# Patient Record
Sex: MALE | NOT HISPANIC OR LATINO | URBAN - METROPOLITAN AREA
[De-identification: names, ages, dates, MRNs, and addresses within clinical notes are randomized per-mention and may not be internally consistent; named-entity substitution may affect disease eponyms.]

---

## 2018-06-11 ENCOUNTER — OFFICE VISIT (OUTPATIENT)
Dept: URBAN - METROPOLITAN AREA CLINIC 33 | Facility: CLINIC | Age: 68
End: 2018-06-11
Payer: MEDICARE

## 2018-06-11 PROCEDURE — 99214 OFFICE O/P EST MOD 30 MIN: CPT | Performed by: OPHTHALMOLOGY

## 2018-06-11 RX ORDER — DORZOLAMIDE HYDROCHLORIDE AND TIMOLOL MALEATE 20; 5 MG/ML; MG/ML
SOLUTION/ DROPS OPHTHALMIC
Qty: 10 | Refills: 3 | Status: INACTIVE
Start: 2018-06-11 | End: 2019-03-15

## 2018-06-11 ASSESSMENT — INTRAOCULAR PRESSURE
OS: 27
OD: 23

## 2018-06-11 NOTE — IMPRESSION/PLAN
Impression: Other specified glaucoma / NVI OS: H40.89. OS. Vision: vision affected. Condition: stable post AV OS #1  01/30/2017. Goal mid teens OU Plan: Discussed diagnosis in detail with patient. Discussed treatment options with patient. Recommend patient start Cosopt BID OU. Stressed compliance of using gtts and not going off gtts without calling office. Patient d/c jackeline. If IOP is still elevated consider Latanoprost QHS.

## 2018-07-24 ENCOUNTER — OFFICE VISIT (OUTPATIENT)
Dept: URBAN - METROPOLITAN AREA CLINIC 32 | Facility: CLINIC | Age: 68
End: 2018-07-24
Payer: MEDICARE

## 2018-07-24 PROCEDURE — 99214 OFFICE O/P EST MOD 30 MIN: CPT | Performed by: OPTOMETRIST

## 2018-07-24 ASSESSMENT — INTRAOCULAR PRESSURE
OS: 13
OD: 15
OS: 16
OD: 17

## 2018-07-24 NOTE — IMPRESSION/PLAN
Impression: Other specified glaucoma / NVI OS: H40.89. OS. Vision: vision affected. Condition: stable post AV OS #1  01/30/2017. Goal mid teens OU Plan: Discussed diagnosis in detail with patient. Discussed treatment options with patient. Recommend patient cont Cosopt BID OU. Stressed compliance of using gtts and not going off gtts without calling office. Patient d/c jackeline. If IOP is still elevated consider Latanoprost QHS.

## 2018-10-23 ENCOUNTER — OFFICE VISIT (OUTPATIENT)
Dept: URBAN - METROPOLITAN AREA CLINIC 32 | Facility: CLINIC | Age: 68
End: 2018-10-23
Payer: MEDICARE

## 2018-10-23 PROCEDURE — 92014 COMPRE OPH EXAM EST PT 1/>: CPT | Performed by: OPTOMETRIST

## 2018-10-23 ASSESSMENT — INTRAOCULAR PRESSURE
OD: 12
OS: 12

## 2018-10-23 ASSESSMENT — VISUAL ACUITY
OS: 20/40
OD: 20/40

## 2019-07-16 ENCOUNTER — OFFICE VISIT (OUTPATIENT)
Dept: URBAN - METROPOLITAN AREA CLINIC 32 | Facility: CLINIC | Age: 69
End: 2019-07-16
Payer: MEDICARE

## 2019-07-16 DIAGNOSIS — H52.4 PRESBYOPIA: Primary | ICD-10-CM

## 2019-07-16 PROCEDURE — V2799 MISC VISION ITEM OR SERVICE: HCPCS | Performed by: OPTOMETRIST

## 2019-07-16 ASSESSMENT — VISUAL ACUITY
OD: 20/40
OS: 20/40

## 2019-10-15 ENCOUNTER — OFFICE VISIT (OUTPATIENT)
Dept: URBAN - METROPOLITAN AREA CLINIC 32 | Facility: CLINIC | Age: 69
End: 2019-10-15
Payer: MEDICARE

## 2019-10-15 DIAGNOSIS — H40.89 OTHER SPECIFIED GLAUCOMA: ICD-10-CM

## 2019-10-15 DIAGNOSIS — H40.1233 LOW-TENSION GLAUCOMA, BILATERAL, SEVERE STAGE: Primary | ICD-10-CM

## 2019-10-15 PROCEDURE — 92133 CPTRZD OPH DX IMG PST SGM ON: CPT | Performed by: OPTOMETRIST

## 2019-10-15 PROCEDURE — 99214 OFFICE O/P EST MOD 30 MIN: CPT | Performed by: OPTOMETRIST

## 2019-10-15 RX ORDER — BRIMONIDINE TARTRATE, TIMOLOL MALEATE 2; 5 MG/ML; MG/ML
SOLUTION/ DROPS OPHTHALMIC
Qty: 1 | Refills: 6 | Status: INACTIVE
Start: 2019-10-15 | End: 2019-10-16

## 2019-10-15 RX ORDER — TIMOLOL 5.12 MG/ML
0.5 % SOLUTION/ DROPS OPHTHALMIC
Qty: 1 | Refills: 3 | Status: INACTIVE
Start: 2019-10-15 | End: 2019-10-16

## 2019-10-15 RX ORDER — BRINZOLAMIDE 10 MG/ML
1 % SUSPENSION/ DROPS OPHTHALMIC
Qty: 1 | Refills: 3 | Status: INACTIVE
Start: 2019-10-15 | End: 2019-10-16

## 2019-10-15 RX ORDER — LATANOPROST 50 UG/ML
0.005 % SOLUTION OPHTHALMIC
Qty: 5 | Refills: 3 | Status: INACTIVE
Start: 2019-10-15 | End: 2020-01-14

## 2019-10-15 ASSESSMENT — INTRAOCULAR PRESSURE
OD: 18
OS: 16

## 2019-10-15 NOTE — IMPRESSION/PLAN
Impression: Other specified glaucoma / NVI OS: H40.89. OS. Vision: vision affected. Condition: stable post AV OS #1  01/30/2017. Goal mid teens OU Plan: Discussed diagnosis in detail with patient. Discussed treatment options with patient. Recommend patient cont Cosopt BID OU. Stressed compliance of using gtts and not going off gtts without calling office. start timolol BID OU azopt BID OU If IOP is still elevated consider Latanoprost QHS.

## 2020-01-14 ENCOUNTER — OFFICE VISIT (OUTPATIENT)
Dept: URBAN - METROPOLITAN AREA CLINIC 32 | Facility: CLINIC | Age: 70
End: 2020-01-14
Payer: MEDICARE

## 2020-01-14 PROCEDURE — 99214 OFFICE O/P EST MOD 30 MIN: CPT | Performed by: OPTOMETRIST

## 2020-01-14 RX ORDER — BRINZOLAMIDE 10 MG/ML
1 % SUSPENSION/ DROPS OPHTHALMIC
Qty: 1 | Refills: 10 | Status: INACTIVE
Start: 2020-01-14 | End: 2020-01-14

## 2020-01-14 RX ORDER — TIMOLOL MALEATE 5 MG/ML
0.5 % SOLUTION/ DROPS OPHTHALMIC
Qty: 5 | Refills: 5 | Status: INACTIVE
Start: 2020-01-14 | End: 2020-08-18

## 2020-01-14 RX ORDER — DORZOLAMIDE HYDROCHLORIDE AND TIMOLOL MALEATE 20; 5 MG/ML; MG/ML
SOLUTION/ DROPS OPHTHALMIC
Qty: 10 | Refills: 3 | Status: INACTIVE
Start: 2020-01-14 | End: 2020-01-14

## 2020-01-14 RX ORDER — BRINZOLAMIDE 10 MG/ML
1 % SUSPENSION/ DROPS OPHTHALMIC
Qty: 5 | Refills: 5 | Status: INACTIVE
Start: 2020-01-14 | End: 2020-08-18

## 2020-01-14 RX ORDER — LATANOPROST 50 UG/ML
0.005 % SOLUTION OPHTHALMIC
Qty: 5 | Refills: 5 | Status: INACTIVE
Start: 2020-01-14 | End: 2021-05-13

## 2020-01-14 RX ORDER — TIMOLOL MALEATE 5 MG/ML
0.5 % SOLUTION/ DROPS OPHTHALMIC
Qty: 5 | Refills: 0 | Status: INACTIVE
Start: 2020-01-14 | End: 2020-01-14

## 2020-01-14 ASSESSMENT — INTRAOCULAR PRESSURE
OS: 15
OD: 14

## 2020-01-14 NOTE — IMPRESSION/PLAN
Impression: Other specified glaucoma / NVI OS: H40.89. OS. Vision: vision affected. Condition: stable post AV OS #1  01/30/2017. Goal mid teens OU Plan: Discussed diagnosis in detail with patient. Discussed treatment options with patient. Recommend patient cont Cosopt BID OU. Stressed compliance of using gtts and not going off gtts without calling office. restart timolol BID OU azopt BID OU If IOP is still elevated, refer for eval with MD

## 2020-08-18 ENCOUNTER — OFFICE VISIT (OUTPATIENT)
Dept: URBAN - METROPOLITAN AREA CLINIC 23 | Facility: CLINIC | Age: 70
End: 2020-08-18
Payer: MEDICARE

## 2020-08-18 PROCEDURE — 92133 CPTRZD OPH DX IMG PST SGM ON: CPT | Performed by: OPHTHALMOLOGY

## 2020-08-18 PROCEDURE — 92014 COMPRE OPH EXAM EST PT 1/>: CPT | Performed by: OPHTHALMOLOGY

## 2020-08-18 PROCEDURE — 92020 GONIOSCOPY: CPT | Performed by: OPHTHALMOLOGY

## 2020-08-18 RX ORDER — DORZOLAMIDE HYDROCHLORIDE AND TIMOLOL MALEATE 20; 5 MG/ML; MG/ML
SOLUTION/ DROPS OPHTHALMIC
Qty: 1 | Refills: 3 | Status: INACTIVE
Start: 2020-08-18 | End: 2020-11-10

## 2020-08-18 ASSESSMENT — INTRAOCULAR PRESSURE
OD: 14
OS: 14

## 2020-08-18 NOTE — IMPRESSION/PLAN
Impression: Chronic angle-closure glaucoma, bilateral, severe stage: G84.7081. Plan: Pt has Glaucoma    Gonio : Synichea, no drain to speak of      Pachs:512/496      Today's IOP :14/14      Tmax & date :  32/50 Target IOP low to mid teens
(+)Fhx of Glaucoma: Mother Right / Left eye is the better seeing eye (Last vf &date )
C/D:  
OCT:59/63 8/18/20 Pt denies Sulfa Allergy   // Pt denies Lung /Heart dx Pt is currently using : Azopt + Timolol BID OU L@ 5:30am. Lumigan QHS OU L@ 9pm. Systane QID OU. No previous medications used  / Previously used medications : 
Plan :
1. Continue (Change brand to generic) Latanoprost QHS OU Dorzolamide-Timolol BID OU 2. Discussed with patient due to findings on OCT, and posterior examination ,continuation of  treatment is recommended for Glaucoma. IOP is too high for the level of glaucomatous damage at the present time. Recommend lowering IOP. Will change patients branded medications to generics.  
3. IOP check in 1 month with  VF 24-2 OD/10-2 OS

## 2020-10-06 ENCOUNTER — OFFICE VISIT (OUTPATIENT)
Dept: URBAN - METROPOLITAN AREA CLINIC 23 | Facility: CLINIC | Age: 70
End: 2020-10-06
Payer: MEDICARE

## 2020-10-06 PROCEDURE — 92014 COMPRE OPH EXAM EST PT 1/>: CPT | Performed by: OPHTHALMOLOGY

## 2020-10-06 ASSESSMENT — INTRAOCULAR PRESSURE
OS: 18
OD: 20

## 2020-10-06 NOTE — IMPRESSION/PLAN
Impression: Chronic angle-closure glaucoma, bilateral, severe stage: Q37.9457. Plan: Pt has Glaucoma    Gonio : Synichea, no drain to speak of      Pachs:512/496      Today's IOP :20/18      Tmax & date :  32/50 Target IOP low to mid teens
(+)Fhx of Glaucoma: Mother Right eye is the better seeing eye Last vf 10/6/20 OD Temporal to inferior step OS: Small island remaining. C/D:  0.9x0.8/0.9*5x0.85 OCT:59/63 8/18/20 Pt denies Sulfa Allergy   // Pt denies Lung /Heart dx Pt is currently using : Azopt + Timolol BID OU L@ 5:30am. Lumigan QHS OU L@ 9pm. Systane QID OU. Previously used medications : Alphagan, Combigan Plan :
1. Continue (N/I brand over generic) Latanoprost QHS OU Dorzolamide-Timolol BID OU ADD Rhopressa QHS OU 2. No improvement with branded medications over generics. Will add a new drop and if no improvement explained to patient surgical intervention is the next step.  
3. IOP check in 6 weeks

## 2021-03-30 ENCOUNTER — OFFICE VISIT (OUTPATIENT)
Dept: URBAN - METROPOLITAN AREA CLINIC 23 | Facility: CLINIC | Age: 71
End: 2021-03-30
Payer: MEDICARE

## 2021-03-30 DIAGNOSIS — H40.1193 PRIMARY OPEN-ANGLE GLAUCOMA, UNSPECIFIED EYE, SEVERE STAGE: Primary | ICD-10-CM

## 2021-03-30 PROCEDURE — 99214 OFFICE O/P EST MOD 30 MIN: CPT | Performed by: OPHTHALMOLOGY

## 2021-03-30 ASSESSMENT — INTRAOCULAR PRESSURE
OS: 12
OD: 17

## 2021-03-30 NOTE — IMPRESSION/PLAN
Impression: Chronic angle-closure glaucoma, bilateral, severe stage: D23.4168. Plan: Pt has Glaucoma    Gonio : Synichea, no drain to speak of      Pachs:512/496      Today's IOP :17/12      Tmax & date :  32/50 Target IOP low to mid teens
(+)Fhx of Glaucoma: Mother Right eye is the better seeing eye Last vf 10/6/20 OD Temporal to inferior step OS: Small island remaining. C/D:  0.9x0.8/0.9*5x0.85 OCT:59/63 8/18/20 Pt denies Sulfa Allergy   // Pt denies Lung /Heart dx Pt is currently using : Azopt + Timolol BID OU L@ 5:30am. Lumigan QHS OU L@ 9pm. Systane QID OU. Previously used medications : Alphagan, Combigan Plan :
1. Continue (N/I brand over generic) Dorzolamide-Timolol BID OU Change Rhopressa to Rocklatan QHS OU 2. No improvement with branded medications over generics. Will add a new drop and if no improvement explained to patient surgical intervention is the next step.  
3. IOP check in 4 weeks

## 2021-05-13 ENCOUNTER — OFFICE VISIT (OUTPATIENT)
Dept: URBAN - METROPOLITAN AREA CLINIC 23 | Facility: CLINIC | Age: 71
End: 2021-05-13
Payer: MEDICARE

## 2021-05-13 DIAGNOSIS — H40.2233 CHRONIC ANGLE-CLOSURE GLAUCOMA, BILATERAL, SEVERE STAGE: Primary | ICD-10-CM

## 2021-05-13 PROCEDURE — 99213 OFFICE O/P EST LOW 20 MIN: CPT | Performed by: OPHTHALMOLOGY

## 2021-05-13 RX ORDER — DORZOLAMIDE HYDROCHLORIDE AND TIMOLOL MALEATE 20; 5 MG/ML; MG/ML
SOLUTION/ DROPS OPHTHALMIC
Qty: 5 | Refills: 3 | Status: ACTIVE
Start: 2021-05-13

## 2021-05-13 RX ORDER — NETARSUDIL AND LATANOPROST OPHTHALMIC SOLUTION, 0.02%/0.005% .2; .05 MG/ML; MG/ML
SOLUTION/ DROPS OPHTHALMIC; TOPICAL
Qty: 2.5 | Refills: 5 | Status: ACTIVE
Start: 2021-05-13

## 2021-05-13 ASSESSMENT — INTRAOCULAR PRESSURE
OD: 13
OS: 11

## 2021-05-13 NOTE — IMPRESSION/PLAN
Impression: Chronic angle-closure glaucoma, bilateral, severe stage: R85.2453. Plan: Pt has Glaucoma    Gonio : Synichea, no drain to speak of      Pachs:512/496      Today's IOP :13/11    Tmax & date :  32/50 Target IOP low to mid teens
(+)Fhx of Glaucoma: Mother Right eye is the better seeing eye Last vf 10/6/20 OD Temporal to inferior step OS: Small island remaining. C/D:  0.9x0.8/0.9*5x0.85 OCT:59/63 8/18/20 Pt denies Sulfa Allergy   // Pt denies Lung /Heart dx Pt is currently using : Azopt + Timolol BID OU L@ 5:30am. Lumigan QHS OU L@ 9pm. Systane QID OU. Previously used medications : Alphagan, Combigan Plan :
1. Continue (N/I brand over generic) Dorzolamide-Timolol BID OU Rocklatan QHS OU 2. IOP much improved with medication change. Will have patient continue as instructed.  
3. 3 month IOP , VF, RNFL

## 2021-09-14 ENCOUNTER — OFFICE VISIT (OUTPATIENT)
Dept: URBAN - METROPOLITAN AREA CLINIC 23 | Facility: CLINIC | Age: 71
End: 2021-09-14
Payer: MEDICARE

## 2021-09-14 PROCEDURE — 99213 OFFICE O/P EST LOW 20 MIN: CPT | Performed by: OPHTHALMOLOGY

## 2021-09-14 ASSESSMENT — INTRAOCULAR PRESSURE
OD: 12
OS: 17

## 2021-09-14 NOTE — IMPRESSION/PLAN
Impression: Chronic angle-closure glaucoma, bilateral, severe stage: N97.4556. Plan: Pt has Glaucoma    Gonio : Synichea, no drain to speak of      Pachs:512/496      Today's IOP :13/11    Tmax & date :  32/50 Target IOP low to mid teens
(+)Fhx of Glaucoma: Mother Right eye is the better seeing eye Last vf 10/6/20 OD Temporal to inferior step OS: Small island remaining. C/D:  0.9x0.8/0.9*5x0.85 OCT:59/63 8/18/20 Pt denies Sulfa Allergy   // Pt denies Lung /Heart dx Plan :
1. Continue (N/I brand over generic) Dorzolamide-Timolol BID OU Rocklatan QHS OU  - expensive per pt - will send Rx to [address] [place] [address] to try and obtain a better price 2. IOP much improved with medication change last exam but elevated OS today. Will have patient continue as instructed and watch carefully.  
3. 1-2 months IOP , VF 10-2 OU

## 2021-11-15 ENCOUNTER — TESTING ONLY (OUTPATIENT)
Dept: URBAN - METROPOLITAN AREA CLINIC 23 | Facility: CLINIC | Age: 71
End: 2021-11-15
Payer: MEDICARE

## 2021-11-15 PROCEDURE — 92083 EXTENDED VISUAL FIELD XM: CPT | Performed by: OPHTHALMOLOGY

## 2021-12-07 ENCOUNTER — OFFICE VISIT (OUTPATIENT)
Dept: URBAN - METROPOLITAN AREA CLINIC 23 | Facility: CLINIC | Age: 71
End: 2021-12-07
Payer: MEDICARE

## 2021-12-07 PROCEDURE — 99213 OFFICE O/P EST LOW 20 MIN: CPT | Performed by: OPHTHALMOLOGY

## 2021-12-07 ASSESSMENT — INTRAOCULAR PRESSURE
OD: 19
OS: 16

## 2021-12-07 NOTE — IMPRESSION/PLAN
Impression: Chronic angle-closure glaucoma, bilateral, severe stage: N18.6364. Plan: Pt has Glaucoma    Gonio : Synichea, no drain to speak of      Pachs:512/496      Today's IOP :19/16    Tmax :  32/50 Target IOP low to mid teens
(+)Fhx of Glaucoma: Mother Right eye is the better seeing eye Last vf 12/7/21 Stable OD Temporal to inferior step OS: Small island remaining. C/D:  0.9x0.8/0.9*5x0.85 OCT:59/63 8/18/20 Pt denies Sulfa Allergy   // Pt denies Lung /Heart dx Plan :
1. Continue (N/I brand over generic) Dorzolamide-Timolol BID OU Rocklatan QHS OU  - Patient reports he runs out of the bottle quickly) 2. IOP much improved with medication change last exam but elevated OS today. Will have patient continue as instructed and watch carefully.  
3. 1-2 months IOP

## 2022-02-01 ENCOUNTER — OFFICE VISIT (OUTPATIENT)
Dept: URBAN - METROPOLITAN AREA CLINIC 23 | Facility: CLINIC | Age: 72
End: 2022-02-01
Payer: MEDICARE

## 2022-02-01 PROCEDURE — 99214 OFFICE O/P EST MOD 30 MIN: CPT | Performed by: OPHTHALMOLOGY

## 2022-02-01 RX ORDER — BRIMONIDINE TARTRATE 1.5 MG/ML
0.15 % SOLUTION OPHTHALMIC
Qty: 7.5 | Refills: 2 | Status: ACTIVE
Start: 2022-02-01

## 2022-02-01 ASSESSMENT — INTRAOCULAR PRESSURE
OD: 21
OS: 21

## 2022-02-01 NOTE — IMPRESSION/PLAN
Impression: Chronic angle-closure glaucoma, bilateral, severe stage: R74.8710.
h/o Alphagan use Plan: Pt has Glaucoma    Gonio : Synichea, no drain to speak of      Pachs:512/496      Today's IOP : 21 OU    Tmax :  32/50 Target IOP low to mid teens
(+)Fhx of Glaucoma: Mother Right eye is the better seeing eye Last vf 12/7/21 Stable OD Temporal to inferior step OS: Small island remaining. C/D:  0.9x0.8/0.9*5x0.85 OCT:59/63 8/18/20 Pt denies Sulfa Allergy // Pt denies Lung /Heart dx Plan :
1. Continue (N/I brand over generic) Dorzolamide-Timolol BID OU Rocklatan QHS OU 2. Start Brimonidine ou bid 3. IOP elevated again today.   
4. RTC in 6-8 weeks for IOP check

## 2022-03-29 ENCOUNTER — OFFICE VISIT (OUTPATIENT)
Dept: URBAN - METROPOLITAN AREA CLINIC 23 | Facility: CLINIC | Age: 72
End: 2022-03-29
Payer: MEDICARE

## 2022-03-29 PROCEDURE — 99214 OFFICE O/P EST MOD 30 MIN: CPT | Performed by: OPHTHALMOLOGY

## 2022-03-29 RX ORDER — OFLOXACIN 3 MG/ML
0.3 % SOLUTION/ DROPS OPHTHALMIC
Qty: 5 | Refills: 1 | Status: ACTIVE
Start: 2022-03-29

## 2022-03-29 RX ORDER — PREDNISOLONE ACETATE 10 MG/ML
1 % SUSPENSION/ DROPS OPHTHALMIC
Qty: 5 | Refills: 1 | Status: ACTIVE
Start: 2022-03-29

## 2022-03-29 ASSESSMENT — INTRAOCULAR PRESSURE
OD: 18
OS: 18

## 2022-05-12 ENCOUNTER — ADULT PHYSICAL (OUTPATIENT)
Dept: URBAN - METROPOLITAN AREA CLINIC 24 | Facility: CLINIC | Age: 72
End: 2022-05-12
Payer: MEDICARE

## 2022-05-12 DIAGNOSIS — H40.2233 CHRONIC ANGLE-CLOSURE GLAUCOMA, BILATERAL, SEVERE STAGE: ICD-10-CM

## 2022-05-12 DIAGNOSIS — Z01.818 ENCOUNTER FOR OTHER PREPROCEDURAL EXAMINATION: Primary | ICD-10-CM

## 2022-05-12 PROCEDURE — 99202 OFFICE O/P NEW SF 15 MIN: CPT | Performed by: PHYSICIAN ASSISTANT

## 2022-05-14 ENCOUNTER — POST-OPERATIVE VISIT (OUTPATIENT)
Dept: URBAN - METROPOLITAN AREA CLINIC 23 | Facility: CLINIC | Age: 72
End: 2022-05-14
Payer: MEDICARE

## 2022-05-14 PROCEDURE — 99024 POSTOP FOLLOW-UP VISIT: CPT | Performed by: OPTOMETRIST

## 2022-05-14 ASSESSMENT — INTRAOCULAR PRESSURE
OD: 28
OS: 17

## 2022-05-14 NOTE — IMPRESSION/PLAN
Impression: S/P Glaucoma surgery with Aqueous shunt placement (Valve/Implant) OD - 1 Day. Encounter for surgical aftercare following surgery on a sense organ  Z48.810. Plan: Monitor. Start Pred Acetate and continue Ofloxacin as prescribed. Continue Rocklatan QHS OS and Systane QID OU. Start Dorzolamide/Timolol BID OU. RTC as scheduled.

## 2022-05-19 ENCOUNTER — POST-OPERATIVE VISIT (OUTPATIENT)
Dept: URBAN - METROPOLITAN AREA CLINIC 23 | Facility: CLINIC | Age: 72
End: 2022-05-19
Payer: MEDICARE

## 2022-05-19 DIAGNOSIS — Z48.810 ENCOUNTER FOR SURGICAL AFTERCARE FOLLOWING SURGERY ON A SENSE ORGAN: Primary | ICD-10-CM

## 2022-05-19 PROCEDURE — 99024 POSTOP FOLLOW-UP VISIT: CPT | Performed by: OPTOMETRIST

## 2022-05-19 ASSESSMENT — INTRAOCULAR PRESSURE
OD: 12
OS: 9

## 2022-05-19 NOTE — IMPRESSION/PLAN
Impression: S/P Glaucoma surgery with Aqueous shunt placement (Valve/Implant) OD - 6 Days. Encounter for surgical aftercare following surgery on a sense organ  Z48.810. Post operative instructions reviewed - Plan: Discussed findings. Reviewed drops with patient, patient reports he has been taking Cosopt BID OU, Rocklatan QHS OS, and Prednisolone QID OD. Advised patient to continue current drop regimen. Call with any vision changes.

## 2022-06-21 ENCOUNTER — OFFICE VISIT (OUTPATIENT)
Dept: URBAN - METROPOLITAN AREA CLINIC 23 | Facility: CLINIC | Age: 72
End: 2022-06-21
Payer: MEDICARE

## 2022-06-21 DIAGNOSIS — H40.2233 CHRONIC ANGLE-CLOSURE GLAUCOMA, BILATERAL, SEVERE STAGE: Primary | ICD-10-CM

## 2022-06-21 PROCEDURE — 99024 POSTOP FOLLOW-UP VISIT: CPT | Performed by: OPHTHALMOLOGY

## 2022-06-21 ASSESSMENT — INTRAOCULAR PRESSURE
OS: 15
OD: 6

## 2022-06-21 NOTE — IMPRESSION/PLAN
Impression: Chronic angle-closure glaucoma, bilateral, severe stage: V94.6236.
h/o Alphagan use (+)Combigan intolerance Plan: Pt has Glaucoma    Gonio : Synichea, no drain to speak of      Pachs:512/496      Today's IOP : 6/15   Tmax :  32/50 Target IOP low to mid teens
(+)Fhx of Glaucoma: Mother Right eye is the better seeing eye Last vf 12/7/21 Stable OD Temporal to inferior step OS: Small island remaining. C/D:  0.9x0.8/0.9*5x0.85 OCT:59/63 8/18/20 Pt denies Sulfa Allergy // Pt denies Lung /Heart dx Plan :
1. Continue (N/I brand over generic) Dorzolamide-Timolol BID OU Rocklatan QHS OU Begin taper of Pred TID x 2 weeks, BID x 2 weeks then QD for 2 weeks and Srop 2. Patient now maxed out topically 3. Healing well from tube shunt surgery OD> Will continue to monitor.

## 2022-08-04 ENCOUNTER — OFFICE VISIT (OUTPATIENT)
Dept: URBAN - METROPOLITAN AREA CLINIC 23 | Facility: CLINIC | Age: 72
End: 2022-08-04
Payer: MEDICARE

## 2022-08-04 DIAGNOSIS — H40.2233 CHRONIC ANGLE-CLOSURE GLAUCOMA, BILATERAL, SEVERE STAGE: Primary | ICD-10-CM

## 2022-08-04 PROCEDURE — 99024 POSTOP FOLLOW-UP VISIT: CPT | Performed by: OPHTHALMOLOGY

## 2022-08-04 ASSESSMENT — INTRAOCULAR PRESSURE
OS: 13
OD: 9

## 2022-08-04 NOTE — IMPRESSION/PLAN
Impression: Chronic angle-closure glaucoma, bilateral, severe stage: J54.9745.
h/o Alphagan use (+)Combigan intolerance s/p [facility] OD Sadiemateo Kennedy) 5/13/22; s/p trab OU, s/p Ahmed OS (outside) Plan: Pt has Glaucoma    Gonio : Synichea, no drain to speak of      Pachs:512/496      Today's IOP : 9/15   Tmax :  32/50 Target IOP low to mid teens
(+)Fhx of Glaucoma: Mother Right eye is the better seeing eye Last vf 12/7/21 Stable OD Temporal to inferior step OS: Small island remaining. C/D:  0.9x0.8/0.9*5x0.85 OCT:59/63 8/18/20 Pt denies Sulfa Allergy // Pt denies Lung /Heart dx Plan :
1. Continue (N/I brand over generic) Dorzolamide-Timolol BID OU Rocklatan QHS OU Begin taper of BID x 2 weeks then QD for 2 weeks and Stop 2. Patient now maxed out topically 3. Healing well from tube shunt surgery OD. Will continue to monitor. 
4. Return in 6-8 weeks with optometry for IOP check - if IOP under 6 reduce glc meds; return Dr. Ruthann Tang in 4 months for new baseline testing

## 2022-09-20 ENCOUNTER — OFFICE VISIT (OUTPATIENT)
Dept: URBAN - METROPOLITAN AREA CLINIC 23 | Facility: CLINIC | Age: 72
End: 2022-09-20
Payer: MEDICARE

## 2022-09-20 DIAGNOSIS — H40.2233 CHRONIC ANGLE-CLOSURE GLAUCOMA, BILATERAL, SEVERE STAGE: Primary | ICD-10-CM

## 2022-09-20 PROCEDURE — 99213 OFFICE O/P EST LOW 20 MIN: CPT | Performed by: OPTOMETRIST

## 2022-09-20 RX ORDER — PREDNISOLONE ACETATE 10 MG/ML
1 % SUSPENSION/ DROPS OPHTHALMIC
Qty: 10 | Refills: 1 | Status: ACTIVE
Start: 2022-09-20

## 2022-09-20 ASSESSMENT — INTRAOCULAR PRESSURE
OD: 9
OS: 12

## 2022-09-20 ASSESSMENT — KERATOMETRY: OD: 44.38

## 2022-09-20 NOTE — IMPRESSION/PLAN
Impression: Chronic angle-closure glaucoma, bilateral, severe stage: J38.4260. Bilateral. Condition: established, stable. Plan: Pt has Glaucoma         Pachs:  046/477    Today's IOP :  9/12       Tmax  : 32/50 Target IOP low to mid teens Pt has Fhx of Glaucoma - mother Right eye is the better seeing eye VF: 11/15/2021 C/D: .9x. 8 / .95x. 85
OCT: 12/07/2021 DE: 10/15/2019 Pt denies Sulfa Allergy   // Pt denies Lung /Heart dx Intolerant to Yong Plan :
1. Continue Dorzolamide/Timolol BID OU Prednisolone BID OD x 1 month, QD OD x 1 month, then d/c
2. Discussion with patient regarding drops. Pt reported pain when he stopped Prednisolone. Recommend long term taper. Patient likely not taking Rocklatan, advised patient to continue Prednisolone and Combigan. Call with any sooner changes.

## 2022-10-25 ENCOUNTER — OFFICE VISIT (OUTPATIENT)
Dept: URBAN - METROPOLITAN AREA CLINIC 23 | Facility: CLINIC | Age: 72
End: 2022-10-25
Payer: MEDICARE

## 2022-10-25 DIAGNOSIS — H40.2233 CHRONIC ANGLE-CLOSURE GLAUCOMA, BILATERAL, SEVERE STAGE: Primary | ICD-10-CM

## 2022-10-25 PROCEDURE — 99213 OFFICE O/P EST LOW 20 MIN: CPT | Performed by: OPTOMETRIST

## 2022-10-25 ASSESSMENT — INTRAOCULAR PRESSURE
OD: 10
OS: 10

## 2022-10-25 ASSESSMENT — KERATOMETRY
OD: 44.00
OS: 43.88

## 2022-10-25 NOTE — IMPRESSION/PLAN
Impression: Chronic angle-closure glaucoma, bilateral, severe stage: E12.9947. Bilateral. Condition: established, stable. Plan: Pt has Glaucoma         Pachs:  284/953    Today's IOP :  10/10       Tmax  : 32/50 Target IOP low to mid teens Pt has Fhx of Glaucoma - mother Right eye is the better seeing eye VF: 11/15/2021 C/D: .9x. 8 / .95x. 85
OCT: 12/07/2021 DE: 10/15/2019 Pt denies Sulfa Allergy   // Pt denies Lung /Heart dx Intolerant to Yong Plan :
1. Continue Dorzolamide/Timolol BID OU
2. IOP and ONH stable. Patient reports irritation resolved after long taper of Prednisolone. No change to current treatment necessary. Return as scheduled with Dr. William Tellez.

## 2022-12-13 ENCOUNTER — OFFICE VISIT (OUTPATIENT)
Dept: URBAN - METROPOLITAN AREA CLINIC 23 | Facility: CLINIC | Age: 72
End: 2022-12-13
Payer: MEDICARE

## 2022-12-13 DIAGNOSIS — H40.2233 CHRONIC ANGLE-CLOSURE GLAUCOMA, BILATERAL, SEVERE STAGE: Primary | ICD-10-CM

## 2022-12-13 PROCEDURE — 92083 EXTENDED VISUAL FIELD XM: CPT | Performed by: OPHTHALMOLOGY

## 2022-12-13 PROCEDURE — 92133 CPTRZD OPH DX IMG PST SGM ON: CPT | Performed by: OPHTHALMOLOGY

## 2022-12-13 PROCEDURE — 99213 OFFICE O/P EST LOW 20 MIN: CPT | Performed by: OPHTHALMOLOGY

## 2022-12-13 ASSESSMENT — INTRAOCULAR PRESSURE
OD: 9
OS: 12

## 2022-12-13 NOTE — IMPRESSION/PLAN
Impression: Chronic angle-closure glaucoma, bilateral, severe stage: W85.9887.
h/o Alphagan use (+)Combigan intolerance s/p [facility] OD Garth Bender) 5/13/22; s/p trab OU, s/p Ahmed OS (outside) Plan: Pt has Glaucoma    Gonio : Synichea, no drain to speak of      Pachs:512/496      Today's IOP : 9/12   Tmax :  32/50 Target IOP low to mid teens
(+)Fhx of Glaucoma: Mother Right eye is the better seeing eye Last vf 12/13/22 Stable OD Temporal to inferior step OS: Small island remaining. C/D:  0.9x0.8/0.9*5x0.85 OCT:79/182 12/13/22 OS unreliable Pt denies Sulfa Allergy // Pt denies Lung /Heart dx Plan :
1. Continue (N/I brand over generic) Dorzolamide-Timolol BID OU Rocklatan QPM OU 2. Patient now maxed out topically, IOP is holding well s/p shunt 3. Healing well from tube shunt surgery OD. Will continue to monitor. 
4. Return in 3 mo Dr. Kylie Hudson [facility] w/ optos, and 6 mo Dr. Wilman Barnard VFT 10-2